# Patient Record
Sex: FEMALE | Race: WHITE
[De-identification: names, ages, dates, MRNs, and addresses within clinical notes are randomized per-mention and may not be internally consistent; named-entity substitution may affect disease eponyms.]

---

## 2017-01-06 ENCOUNTER — HOSPITAL ENCOUNTER (OUTPATIENT)
Dept: HOSPITAL 80 - FPAT | Age: 67
Discharge: HOME | End: 2017-01-06
Attending: PAIN MEDICINE
Payer: MEDICAID

## 2017-01-06 DIAGNOSIS — M25.551: ICD-10-CM

## 2017-01-06 DIAGNOSIS — M16.11: Primary | ICD-10-CM

## 2017-01-06 NOTE — DX
Fluoroscopy Greater Than an Hour

 

Indication: Right hip injection.

 

Fluoroscopy Time: 48.7 seconds.

Dose: 12.85 mGy.

 

Findings:  Two spot fluoroscopic images were performed showing needle approach to the right hip with 
contrast injection.

 

Impression: Fluoroscopy provided for right hip injection. Please see separate report for full clinica
l detail.

## 2017-01-10 NOTE — GPN
[f 
rep st]



                                                                 PROCEDURE NOTE



DATE OF PROCEDURE:  01/06/2017



TIME OF PROCEDURE:  10 a.m.



HISTORY OF PRESENT ILLNESS:  The patient presents for right hip joint injection 
for the treatment of right hip pain and osteoarthritis.  The last such 
injection done 07/27/2016 provided relief until about 1 month ago.  She is not 
taking any blood thinners or antibiotics and denies allergies to shellfish, 
latex, contrast dye, and iodine.  She denies pregnancy.



PROCEDURE:  Right hip joint injection.



DIAGNOSIS:  Right hip osteoarthritis and pain.



SITE:  Right groin.



PROVIDER:  Kasia Pan MD



ANESTHESIA:  Local with Versed 6 mg and fentanyl 100 mcg IV.



COMPLICATIONS:  None.



ESTIMATED BLOOD LOSS:  Minimal.



PREPROCEDURE CONSENT:  The preprocedure consent was obtained after the risks, 
benefits, and alternatives of the procedure were explained to the patient.  The 
risks include but are not limited to nerve injury, joint injury, spinal cord 
injury, blood vessel injury, paralysis, brain injury or stroke, muscle injury, 
infection, adverse medication effect, bleeding, increased pain, death, and any 
unforeseen consequences.  The patient agreed and signed the consent for the 
procedure.



PROCEDURE VERIFICATION AND TIMEOUT:  Verbal verification of patient, site, and 
procedure was done.  All present were in agreement.  Please see nursing note 
for time of timeout.



DESCRIPTION OF PROCEDURE:  The patient was identified and placed in a supine 
position with the right hip externally rotated.  Under fluoroscopic guidance, 
the femoral neck was identified and the area over the target site was prepped 
with ChloraPrep and draped in a sterile fashion.  Then, 2 mL of 1% lidocaine 
were injected subcutaneously over the above location.  After adequate local 
analgesia was obtained, a 22-gauge 3-1/2 inch spinal needle was advanced into 
the hip joint capsule under intermittent biplanar fluoroscopic guidance.  The 
needle position was confirmed to be correct with fluoroscopy.  After negative 
aspiration, 1 mL of nonionic contrast dye was injected.  Good flow of dye was 
noted in a pattern outlining the hip joint.  Then, 8 mL of a solution 
containing 80 mg of triamcinolone and 0.25% bupivacaine were injected in 1 mL 
increments with negative aspiration in between and no pain or paresthesia upon 
injection. 



The patient tolerated the procedure well and was monitored for 30 more minutes 
with no apparent complications, then was discharged home in good condition with 
a ride.  She experienced no side effects from sedation and was instructed not 
to drive, operate heavy machinery, or make any life-altering decisions today.  
She was instructed to call our clinic with nonurgent concerns or 911 in an 
emergency.  In particular, she was taught that new weakness or numbness, 
changes in bowel or bladder control, fever and swelling or redness over the 
injection site are all urgent concerns that would warrant calling 911 or going 
to an emergency care facility.  She verbalized understanding and was discharged 
home with postprocedure instructions.



ASSESSMENT AND PLAN:  Right hip joint injection done today without 
complications.  The patient will return to our clinic for followup and we will 
consider repeating the injection as needed.





Job #:  [id number]

MTDD

## 2017-03-01 ENCOUNTER — HOSPITAL ENCOUNTER (OUTPATIENT)
Dept: HOSPITAL 80 - FSGY | Age: 67
Discharge: HOME | End: 2017-03-01
Attending: PAIN MEDICINE
Payer: MEDICAID

## 2017-03-01 DIAGNOSIS — M25.551: ICD-10-CM

## 2017-03-01 DIAGNOSIS — M70.61: Primary | ICD-10-CM

## 2017-03-01 DIAGNOSIS — J44.9: ICD-10-CM

## 2017-03-01 DIAGNOSIS — F31.9: ICD-10-CM

## 2017-05-11 ENCOUNTER — HOSPITAL ENCOUNTER (EMERGENCY)
Dept: HOSPITAL 80 - CED | Age: 67
Discharge: HOME | End: 2017-05-11
Payer: MEDICAID

## 2017-05-11 VITALS
SYSTOLIC BLOOD PRESSURE: 107 MMHG | TEMPERATURE: 98.6 F | HEART RATE: 105 BPM | OXYGEN SATURATION: 91 % | DIASTOLIC BLOOD PRESSURE: 65 MMHG

## 2017-05-11 VITALS — RESPIRATION RATE: 16 BRPM

## 2017-05-11 DIAGNOSIS — Z79.82: ICD-10-CM

## 2017-05-11 DIAGNOSIS — J44.9: Primary | ICD-10-CM

## 2017-05-11 LAB
% IMMATURE GRANULYOCYTES: 0.7 % (ref 0–1.1)
ABSOLUTE IMMATURE GRANULOCYTES: 0.06 10^3/UL (ref 0–0.1)
ABSOLUTE NRBC COUNT: 0 10^3/UL (ref 0–0.01)
ADD DIFF?: NO
ADD MORPH?: NO
ADD SCAN?: NO
ANION GAP SERPL CALC-SCNC: 11 MEQ/L (ref 8–16)
ATYPICAL LYMPHOCYTE FLAG: 40 (ref 0–99)
CALCIUM SERPL-MCNC: 9.1 MG/DL (ref 8.5–10.4)
CHLORIDE SERPL-SCNC: 96 MEQ/L (ref 97–110)
CO2 SERPL-SCNC: 30 MEQ/L (ref 22–31)
CREAT SERPL-MCNC: 0.6 MG/DL (ref 0.6–1)
ERYTHROCYTE [DISTWIDTH] IN BLOOD BY AUTOMATED COUNT: 11.6 % (ref 11.5–15.2)
FRAGMENT RBC FLAG: 0 (ref 0–99)
GFR SERPL CREATININE-BSD FRML MDRD: > 60 ML/MIN/{1.73_M2}
GLUCOSE SERPL-MCNC: 189 MG/DL (ref 70–100)
HCT VFR BLD CALC: 39.9 % (ref 38–47)
HGB BLD-MCNC: 14.1 G/DL (ref 12.6–16.3)
LEFT SHIFT FLG: 10 (ref 0–99)
LIPEMIA HEMOLYSIS FLAG: 90 (ref 0–99)
MCH RBC BLDCO QN: 34.5 PG (ref 27.9–34.1)
MCHC RBC AUTO-ENTMCNC: 35.3 G/DL (ref 32.4–36.7)
MCV RBC AUTO: 97.6 FL (ref 81.5–99.8)
NRBC-AUTO%: 0 % (ref 0–0.2)
PLATELET # BLD: 238 10^3/UL (ref 150–400)
PLATELET CLUMPS FLAG: 10 (ref 0–99)
PMV BLD AUTO: 9.8 FL (ref 8.7–11.7)
POTASSIUM SERPL-SCNC: 3.1 MEQ/L (ref 3.5–5.2)
RBC # BLD AUTO: 4.09 10^6/UL (ref 4.18–5.33)
SODIUM SERPL-SCNC: 137 MEQ/L (ref 134–144)
TROPONIN I SERPL-MCNC: < 0.012 NG/ML (ref 0–0.03)

## 2017-05-11 NOTE — EDPHY
HPI/HX/ROS/PE/MDM


Narrative: 





CHIEF COMPLAINT:  Chest pain





HPI: The patient is a 66-year-old female with a history of COPD.  She has been 

in her usual state of health until this morning she woke up and coughed.  

Immediately following coughing, the patient experienced sharp anterior chest 

pain which persisted.  Pain was primarily present only with coughing and has 

now largely subsided.  She describes this as the worst pain she has felt.  She 

denies diaphoresis, shortness of breath or syncope.  She denies recent illness 

or hemoptysis.  She wears 2 L of oxygen at baseline.





REVIEW OF SYSTEMS:


Aside from elements discussed in the HPI, a comprehensive 10-point review of 

systems was reviewed and is negative.





PMH:  COPD, no known cardiac history.





SOCIAL HISTORY:  Trying to quit smoking.  Denies alcohol or drug abuse.





PHYSICAL EXAM:


General:Patient is alert, in no acute distress.


ENT:Eyes are normal to inspection.  ENT inspection normal.


Neck: Normal inspection.  Full range of motion.


Respiratory:No respiratory distress. Bilateral expiratory wheezes and 

diminished air movement noted.


Cardiovascular: Regular rate and rhythm.  Strong peripheral pulses.  Normal cap 

refill.


Abdomen:The abdomen is nontender to palpation. There are no peritoneal signs. 

There are normal bowel sounds.


Back: Normal to inspection.  No tenderness to palpation.


Skin: Normal color.  No rash.  Warm and dry.


Extremities: Normal appearance.  Full range of motion.


Neuro: Oriented x3.  Normal motor function.  Normal sensory function. (Daryn De Leon)


ED Course: 





EKG reveals sinus tachycardia with no acute ST changes. (Daryn De Leon)


MDM: 





1531: CT scan of the angiogram chest with IV contrast.  The results of the 

study are shows no evidence of pulmonary embolism there is mucous plugging and 

atelectasis of the left lingula.  No focal pneumonia.  There is bronchial wall 

thickening..  The study was read by Dr. Napoleon Longo. I viewed the images 

myself on the PACS system.





1557:  This patient was signed over to me at 3:00 p.m. shift change from Dr. De Leon.  The patient was pending a CT angiogram per Dr. De Leon the patient 

go home with the CT angiogram showed no pulmonary embolism.  CT angiogram has 

been reviewed shows linear atelectasis mucous plugging at the left lung.  This 

consistent where it is on the x-ray.  No focal pneumonia.  No PE.  I did offer 

patient admission for shortness of breath however she has declined.  She does 

have significant COPD, wears 2 L 24 hours a day at home, she tells me normally 

her oxygen saturation runs around 89-91%.  On 2 L she tells me she runs 93-94.  

Here in the emergency room she has been running 89% to 90 on room air.  I did 

offer breathing treatment here before going home she tells me that she has 

nebulizer at home and medications for breathing treatment she would not like 1 

here and she would like to go home.  She is requesting discharge. She does not 

have chest pain.  Troponin negative.  D-dimer positive CT angiogram negative 

for PE.  COPD appearing picture on chest x-ray and CT. (Lobo Dobbs)





- Data Points


Imaging Results: 


 Imaging Impressions





Chest X-Ray  05/11/17 13:35


Impression:


1. COPD with mild perihilar bronchitis.


2. Interim development of a lingular infiltrate (and/or subsegmental atelectasis

) since 12/1/2016. Follow-up to assure resolution is recommended.


 


Findings and recommendations were discussed with Daryn De Leon MD  at  14:35

, on 5/11/2017.


 








Chest/Thorax CTA  05/11/17 14:42


Impression:


1. No evidence of thrombopulmonary embolic disease.


2. Three-vessel calcified coronary plaque.


3. Minimally worse lingular atelectasis and peripheral mucous plugging.


4. No acute pulmonary process.


5. Tiny benign left upper lobe pulmonary nodules.


 


Findings discussed with Emergency Department physician, Daryn De Leon MD on 

5/11/2017 at 1530 hours.


 











Laboratory Results: 


 Laboratory Results





 05/11/17 14:10 





 05/11/17 14:10 





 











  05/11/17 05/11/17 05/11/17





  14:10 14:10 14:10


 


WBC      8.72 10^3/uL 10^3/uL





     (3.80-9.50) 


 


RBC      4.09 10^6/uL L 10^6/uL





     (4.18-5.33) 


 


Hgb      14.1 g/dL g/dL





     (12.6-16.3) 


 


Hct      39.9 % %





     (38.0-47.0) 


 


MCV      97.6 fL fL





     (81.5-99.8) 


 


MCH      34.5 pg H pg





     (27.9-34.1) 


 


MCHC      35.3 g/dL g/dL





     (32.4-36.7) 


 


RDW      11.6 % %





     (11.5-15.2) 


 


Plt Count      238 10^3/uL 10^3/uL





     (150-400) 


 


MPV      9.8 fL fL





     (8.7-11.7) 


 


Neut % (Auto)      74.9 % H %





     (39.3-74.2) 


 


Lymph % (Auto)      13.9 % L %





     (15.0-45.0) 


 


Mono % (Auto)      9.5 % %





     (4.5-13.0) 


 


Eos % (Auto)      0.5 % L %





     (0.6-7.6) 


 


Baso % (Auto)      0.5 % %





     (0.3-1.7) 


 


Nucleat RBC Rel Count      0.0 % %





     (0.0-0.2) 


 


Absolute Neuts (auto)      6.54 10^3/uL H 10^3/uL





     (1.70-6.50) 


 


Absolute Lymphs (auto)      1.21 10^3/uL 10^3/uL





     (1.00-3.00) 


 


Absolute Monos (auto)      0.83 10^3/uL H 10^3/uL





     (0.30-0.80) 


 


Absolute Eos (auto)      0.04 10^3/uL 10^3/uL





     (0.03-0.40) 


 


Absolute Basos (auto)      0.04 10^3/uL 10^3/uL





     (0.02-0.10) 


 


Absolute Nucleated RBC      0.00 10^3/uL 10^3/uL





     (0-0.01) 


 


Immature Gran %      0.7 % %





     (0.0-1.1) 


 


Immature Gran #      0.06 10^3/uL 10^3/uL





     (0.00-0.10) 


 


D-Dimer    0.61 ug/mLFEU H ug/mLFEU  





    (0.00-0.50)  


 


Sodium  137 mEq/L mEq/L    





   (134-144)   


 


Potassium  3.1 mEq/L L mEq/L    





   (3.5-5.2)   


 


Chloride  96 mEq/L L mEq/L    





   ()   


 


Carbon Dioxide  30 mEq/l mEq/l    





   (22-31)   


 


Anion Gap  11 mEq/L mEq/L    





   (8-16)   


 


BUN  10 mg/dL mg/dL    





   (7-23)   


 


Creatinine  0.6 mg/dL mg/dL    





   (0.6-1.0)   


 


Estimated GFR  > 60     





    


 


Glucose  189 mg/dL H mg/dL    





   ()   


 


Calcium  9.1 mg/dL mg/dL    





   (8.5-10.4)   


 


Troponin I  < 0.012 ng/mL ng/mL    





   (0-0.034)   














General


Time Seen by Provider: 05/11/17 13:36


Initial Vital Signs: 


 Initial Vital Signs











Temperature (C)  37.2 C   05/11/17 13:59


 


Heart Rate  119 H  05/11/17 13:59


 


Respiratory Rate  18   05/11/17 13:59


 


Blood Pressure  131/64 H  05/11/17 13:59


 


O2 Sat (%)  85 L  05/11/17 13:59








 











O2 Delivery Mode               Nasal Cannula


 


O2 (L/minute)                  2














Allergies/Adverse Reactions: 


 





No Known Allergies Allergy (Verified 01/05/17 16:50)


 








Home Medications: 














 Medication  Instructions  Recorded


 


Advair 100/50 (*)  IH BID 07/19/16


 


IBUPROFEN 800 mg pe PO BID 07/19/16


 


Senna 1 tab PO DAILY 07/19/16


 


Spiriva Handihaler 18 mcg IH DAILY 07/19/16


 


ZOLPIDEM TARTRATE 12.5 mg PO DAILY 07/19/16


 


traMADol 50 mg PO PRN PRN 07/19/16


 


Furosemide  01/05/17


 


Herbals/Supplements -Info Only  01/05/17


 


Albuterol 5 mg/ml INH  02/22/17


 


Aspirin  02/22/17


 


Cyclobenzaprine  02/22/17


 


Flonase Nasal Spray  02/22/17


 


Omeprazole  02/22/17


 


Potassium  02/22/17


 


Simvastatin  02/22/17


 


Xtampza ER  02/22/17


 


Oxycodone HCl ER  03/01/17














Departure





- Departure


Disposition: Home, Routine, Self-Care


Clinical Impression: 


 Acute dyspnea





COPD (chronic obstructive pulmonary disease)


Qualifiers:


 COPD type: unspecified COPD Qualified Code(s): J44.9 - Chronic obstructive 

pulmonary disease, unspecified





Condition: Good


Instructions:  COPD (Chronic Obstructive Pulmonary Disease) (ED)


Additional Instructions: 


1. please follow up with her primary care doctor


2. Return emergency room if there is any worsening symptoms questions or 

concerns.


Referrals: 


Krystle Pinto MD [Primary Care Provider] - As per Instructions

## 2017-05-14 NOTE — CPEKG
Heart Rate: 111

RR Interval: 541

P-R Interval: 152

QRSD Interval: 102

QT Interval: 360

QTC Interval: 489

P Axis: 68

QRS Axis: 19

T Wave Axis: 40

EKG Severity - BORDERLINE ECG -

EKG Impression: SINUS TACHYCARDIA

EKG Impression: LOW VOLTAGE IN FRONTAL LEADS

EKG Impression: BORDERLINE PROLONGED QT INTERVAL

Electronically Signed By: Bridgette Galvan 15-May-2017 17:24:53

## 2017-05-28 ENCOUNTER — HOSPITAL ENCOUNTER (OUTPATIENT)
Dept: HOSPITAL 80 - FCPNEURO | Age: 67
End: 2017-05-28
Attending: PSYCHIATRY & NEUROLOGY
Payer: MEDICAID

## 2017-05-28 DIAGNOSIS — G47.33: Primary | ICD-10-CM

## 2017-07-11 ENCOUNTER — HOSPITAL ENCOUNTER (OUTPATIENT)
Dept: HOSPITAL 80 - FCPNEURO | Age: 67
End: 2017-07-11
Attending: PSYCHIATRY & NEUROLOGY
Payer: COMMERCIAL

## 2017-07-11 DIAGNOSIS — G47.34: ICD-10-CM

## 2017-07-11 DIAGNOSIS — G47.61: ICD-10-CM

## 2017-07-11 DIAGNOSIS — G47.33: Primary | ICD-10-CM

## 2017-10-09 ENCOUNTER — HOSPITAL ENCOUNTER (OUTPATIENT)
Dept: HOSPITAL 80 - CIMAGING | Age: 67
End: 2017-10-09
Attending: NURSE PRACTITIONER
Payer: COMMERCIAL

## 2017-10-09 DIAGNOSIS — M47.816: ICD-10-CM

## 2017-10-09 DIAGNOSIS — M16.0: Primary | ICD-10-CM

## 2017-12-26 ENCOUNTER — HOSPITAL ENCOUNTER (OUTPATIENT)
Dept: HOSPITAL 80 - BHFA | Age: 67
End: 2017-12-26
Attending: INTERNAL MEDICINE
Payer: COMMERCIAL

## 2017-12-26 DIAGNOSIS — I25.10: Primary | ICD-10-CM

## 2017-12-26 PROCEDURE — A9500 TC99M SESTAMIBI: HCPCS

## 2017-12-26 PROCEDURE — 93017 CV STRESS TEST TRACING ONLY: CPT

## 2017-12-26 PROCEDURE — 78452 HT MUSCLE IMAGE SPECT MULT: CPT

## 2018-02-28 ENCOUNTER — HOSPITAL ENCOUNTER (INPATIENT)
Dept: HOSPITAL 80 - F3N | Age: 68
LOS: 3 days | Discharge: NURSING, REHAB OR LONG-TERM CARE | DRG: 470 | End: 2018-03-03
Attending: ORTHOPAEDIC SURGERY | Admitting: ORTHOPAEDIC SURGERY
Payer: COMMERCIAL

## 2018-02-28 DIAGNOSIS — G47.30: ICD-10-CM

## 2018-02-28 DIAGNOSIS — F17.210: ICD-10-CM

## 2018-02-28 DIAGNOSIS — M16.11: Primary | ICD-10-CM

## 2018-02-28 PROCEDURE — 0SR904A REPLACEMENT OF RIGHT HIP JOINT WITH CERAMIC ON POLYETHYLENE SYNTHETIC SUBSTITUTE, UNCEMENTED, OPEN APPROACH: ICD-10-PCS | Performed by: ORTHOPAEDIC SURGERY

## 2018-02-28 PROCEDURE — C1713 ANCHOR/SCREW BN/BN,TIS/BN: HCPCS

## 2018-02-28 RX ADMIN — Medication SCH MLS: at 14:09

## 2018-02-28 RX ADMIN — FAMOTIDINE SCH MG: 20 TABLET, FILM COATED ORAL at 21:20

## 2018-02-28 RX ADMIN — OXYCODONE HYDROCHLORIDE PRN MG: 15 TABLET ORAL at 18:00

## 2018-02-28 RX ADMIN — ACETAMINOPHEN SCH MG: 325 TABLET ORAL at 17:56

## 2018-02-28 RX ADMIN — SODIUM CHLORIDE, SODIUM LACTATE, POTASSIUM CHLORIDE, AND CALCIUM CHLORIDE SCH MLS: 600; 310; 30; 20 INJECTION, SOLUTION INTRAVENOUS at 12:53

## 2018-02-28 RX ADMIN — ACETAMINOPHEN SCH MG: 325 TABLET ORAL at 12:51

## 2018-02-28 RX ADMIN — Medication SCH MLS: at 22:50

## 2018-02-28 RX ADMIN — DOCUSATE SODIUM AND SENNOSIDES SCH TAB: 50; 8.6 TABLET ORAL at 21:20

## 2018-02-28 RX ADMIN — MORPHINE SULFATE SCH MG: 15 TABLET, FILM COATED, EXTENDED RELEASE ORAL at 21:21

## 2018-02-28 RX ADMIN — PRAVASTATIN SODIUM SCH MG: 10 TABLET ORAL at 17:56

## 2018-02-28 RX ADMIN — ASPIRIN SCH MG: 325 TABLET, FILM COATED ORAL at 21:21

## 2018-02-28 RX ADMIN — TRANEXAMIC ACID SCH MG: 650 TABLET ORAL at 17:56

## 2018-02-28 RX ADMIN — OXYCODONE HYDROCHLORIDE PRN MG: 15 TABLET ORAL at 12:52

## 2018-02-28 RX ADMIN — CYCLOBENZAPRINE HYDROCHLORIDE PRN MG: 10 TABLET, FILM COATED ORAL at 12:52

## 2018-02-28 RX ADMIN — KETOROLAC TROMETHAMINE PRN MG: 30 INJECTION, SOLUTION INTRAMUSCULAR at 12:51

## 2018-02-28 RX ADMIN — SODIUM CHLORIDE, SODIUM LACTATE, POTASSIUM CHLORIDE, AND CALCIUM CHLORIDE SCH MLS: 600; 310; 30; 20 INJECTION, SOLUTION INTRAVENOUS at 21:21

## 2018-02-28 NOTE — PDANEPAE
ANE Past Medical History





- Cardiovascular History


Hx Hypertension: No


Hx Arrhythmias: No


Hx Chest Pain: No


Hx Coronary Artery / Peripheral Vascular Disease: Yes


Hx CHF / Valvular Disease: No


Hx Palpitations: No


Cardiovascular History Comment: ATHEROSCLEROSIS.  HYPERLIPIDEMIA





- Pulmonary History


Hx COPD: Yes


Hx Asthma/Reactive Airway Disease: No


Hx Recent Upper Respiratory Infection: No


Hx Oxygen in Use at Home: Yes


O2 in Use at Home (L/minute): NOC O2 W/CPAP


Hx Sleep Apnea: Yes


Sleep Apnea Screening Result - Last Documented: Positive


Pulmonary History Comment: DANNI W/CPAP





- Neurologic History


Hx Cerebrovascular Accident: No


Hx Seizures: No


Hx Dementia: No


Neurologic History Comment: none





- Endocrine History


Hx Diabetes: No


Endocrine History Comment: PRE DIAB





- Renal History


Hx Renal Disorders: No





- Liver History


Hx Hepatic Disorders: No


Hepatic History Comment: GALL STONES





- Neurological & Psychiatric Hx


Hx Neurological and Psychiatric Disorders: No


Neurological / Psychiatric History Comment: knee,ankle JOINT pain





- Cancer History


Hx Cancer: No





- Congenital Disorder History


Hx Congenital Disorders: No





- GI History


Hx Gastrointestinal Disorders: No


Gastrointestinal History Comment: OCCAS HEARTBURN





- Other Health History


Other Health History: CHRONIC PAIN PT.  DJD/DDD.  OSTEOPOROSIS





- Chronic Pain History


Chronic Pain: Yes (ARTHRITIS BACK AND JOINTS)





- Surgical History


Prior Surgeries: finger ORIF,.  hernia repair after birth of twins.  HAND 

TRAUMA.  EPIDURAL INJS STEROID.  COLONOSCOPY





ANE Review of Systems


Review of Systems: 








- Exercise capacity


METS (RN): 4 METS





ANE Patient History





- Allergies


Allergies/Adverse Reactions: 








No Known Allergies Allergy (Verified 02/28/18 08:38)


 








- Home Medications


Home Medications: 








Albuterol [Proventil Inhaler HFA (*)] 1 - 2 puffs IH DAILY PRN 01/30/18 [Last 

Taken 02/21/18]


Calcium Carbonate [Oyster Shell Calcium 500 mg (*)] 500 mg PO DAILY 01/30/18 [

Last Taken 02/18/18]


Cholecalciferol Vit D3 [Vitamin D3 (*)] 5,000 units PO DAILY 01/30/18 [Last 

Taken 02/18/18]


Cyanocobalamin [Vitamin B12 (*)] 1,000 mcg PO DAILY 01/30/18 [Last Taken 02/18/ 18]


Cyclobenzaprine [Flexeril 10 MG (*)] 10 mg PO TID PRN 01/30/18 [Last Taken 02/27 /18]


Fluticasone Nasal [Flonase Nasal Spray (RX)] 1 sprays NASAL BID PRN 01/30/18 [

Last Taken 02/27/18]


Furosemide [Lasix 20 MG (*)] 20 mg PO DAILY PRN 01/30/18 [Last Taken 02/26/18]


Herbals/Supplements -Info Only 1 ea PO DAILY 01/30/18 [Last Taken 02/18/18]


Ibuprofen [Motrin (*)] 800 mg PO TID 01/30/18 [Last Taken 02/18/18]


Magnesium Hydroxide [Milk of Magnesia] 400 mg PO DAILY PRN 01/30/18 [Last Taken 

02/18/18]


Potassium Cl [Klor-Con] 10 meq PO DAILY PRN 01/30/18 [Last Taken 02/26/18]


Simvastatin [Zocor] 5 mg PO DAILY18 01/30/18 [Last Taken 02/27/18]


diphenhydrAMINE [Benadryl 25 MG (*)] 25 mg PO HS 01/30/18 [Last Taken 02/26/18]


morphINE IR [morphINE IR 15 mg (*)] 15 mg PO TID PRN 01/30/18 [Last Taken 02/28/ 18]


morphINE SR [Ms Contin/Oramorph 15 mg (*)] 15 mg PO BID 01/30/18 [Last Taken 02/ 28/18]


traMADol [Ultram 50 mg (*)] 50 mg PO TID PRN 01/30/18 [Last Taken 02/27/18]








- NPO status


NPO Since - Liquids (Date): 02/28/18


NPO Since - Liquids (Time): 06:30


NPO Since - Solids (Date): 02/27/18


NPO Since - Solids (Time): 23:00





- Smoking Hx


Smoking Status: Light smoker





- Family Anes Hx


Family Hx Anesthesia Complications: NONE





ANE Labs/Vital Signs





- Vital Signs


Blood Pressure: 99/60


Heart Rate: 82


Respiratory Rate: 16


O2 Sat (%): 90


Height: 162.56 cm


Weight: 65.317 kg





ANE Physical Exam





- Airway


Neck exam: decreased ROM


Mallampati Score: Class 3


Mouth exam: normal dental/mouth exam





- Pulmonary


Pulmonary: no respiratory distress





- Cardiovascular


Cardiovascular: regular rate and rhythym





- ASA Status


ASA Status: III





ANE Anesthesia Plan


Anesthesia Plan: spinal

## 2018-02-28 NOTE — POSTOPPROG
Post Op Note


Date of Operation: 02/28/18


Surgeon: Gurjit Vicente


Assistant: Nic/Lory


Anesthesiologist: Keyonna


Post-op Diagnosis:  right hip severe degenerative arthritis


Procedure:  right total hip arthroplasty


Inf/Abcess present in the surg proc area at time of surgery?: No


EBL: 100-500

## 2018-02-28 NOTE — GOP
[f rep st]



                                                                OPERATIVE REPORT





DATE OF OPERATION:  02/28/2018



SURGEON:  Gurjit Vicente MD



ASSISTANT:  Trell Lucero and Kalpesh Henley.



ANESTHESIA:  Marcaine, spinal and IV sedation by Dr. Willi Newby.



PREOPERATIVE DIAGNOSIS:  Right hip severe degenerative arthritis.



POSTOPERATIVE DIAGNOSIS:  Right hip severe degenerative arthritis.



PROCEDURE PERFORMED:  Right total hip arthroplasty, ceramic femoral head on highly cross-linked polye
thylene cup liner.



FINDINGS:  





DESCRIPTION OF PROCEDURE:  The patient was given 2 g of IV Ancef preoperatively within 60 minutes of 
surgery.  She was also given IV tranexamic acid at a dose of 20 mg/kg.  She was placed on the operati
ng room table and given spinal anesthesia with Marcaine by Dr. Newby.  She was then placed supine 
and given IV sedation.  A Rose catheter was not used.  She wore a JANKI stocking and SCD on the nonope
rative leg.  She was rolled to the left lateral decubitus position.  The position was secured with 
e pegboard table attachment.  An axillary roll was used, and all pressure points were carefully padde
d.  I was careful to lock her pelvis in a vertical position.  Her perineum was isolated with plastic 
adhesive drapes.  The right hip and right lower extremity were prepped with ChloraPrep.  They were dr
aped free using sterile sheets, stockinette, and Ioban plastic adhesive drapes. 



The [employer] time-out was performed to verify the correct surgical side and site and
 the correct patient identity.  The [place] time-out was also performed. 



I made a 5-inch straight oblique posterolateral hip skin incision.  The subcutaneous tissues were sha
rply divided and hemostasis was obtained using electrocautery.  The fascia bryce was identified and sp
lit along the axis of its fibers.  I curved posteriorly and proximally, and split the fascia of the g
luteus joseluis and bluntly split the muscle fibers in line with their orientation.  The Charnley self
-retaining retractor was inserted.  Her sciatic nerve was located, partially exposed, and protected t
hroughout the procedure.  The external rotators and the posterior hip capsule were divided as separat
e layers at the base of the femoral neck, tagged, and reflected posteriorly.  A smooth 8-inch Steinma
nn pin was inserted vertically into the ilium, superior to the acetabulum.  A 1/8-inch drill bit was 
inserted vertically into the greater trochanter and parallel to the first pin.  The distance between 
the two was measured for leg length reference.  Her femoral head was dislocated posteriorly.  Severe 
degenerative changes were present on her femoral head.  The femoral neck was osteotomized at the appr
opriate level and inclination. 



I was careful to preserve all the posterior capsule and most of the anterior capsule.  The remnant of
 her damaged labrum was excised. 



I prepared the femur first.  This allowed me to  the amount of natural femoral neck anteversion.
  This, in turn, allowed me to later determine the correct amount of cup anteversion.  She had approx
imately 20 degrees of natural femoral neck anteversion.  Her canal was opened laterally with a box ch
dalia.  I power reamed with the starter reamer and then hand broached sequentially up to a size 5.  I 
used a Candelaria Accolade II high offset broach as a trial stem.  I was careful to lateralize adequatel
y. 



Appropriate retractors were inserted to expose the acetabulum.  The acetabulum was reamed sequentiall
y up to 53 mm.  I selected a 54 mm [place] Tritanium cluster hole hemispherical shell.  This was memo
ed securely into place in the proper degree of inclination and anteversion.  I used the transverse ac
etabular ligament and other acetabular bony landmarks to help me properly orient the cup.  She had a 
thin medial wall of the acetabulum and I was careful not to over ream.  I inserted 30 mm and 25 mm lance
pplemental screws through the shell for additional fixation.  I also inserted a screw in metal dome h
ole plug. 



I performed a series of trial reductions to determine length and stability.  I concluded that the siz
e 5 stem with high offset with the -2.5 mm neck length and a 36 mm head with a 0 degree or flush line
r gave me the proper combination of appropriate length and good anterior and posterior stability. 

The flush, or 0-degree [place] X3 highly cross-linked polyethylene liner, was inserted and tapped sec
urely into place.  I chose the [place] Accolade II stem in a size 5 with high offset.  This was inser
janki press-fit.  I did one final trial reduction and confirmed that the -2.5 mm neck length with the 3
6 mm head was the proper combination.  The Candelaria Biolox Delta ceramic head with an outside diameter
 of 36 mm and a neck length of -2.5 was tapped securely onto the clean trunnion.  The acetabulum was 
irrigated and cleaned, and the hip was reduced one final time.  She had excellent anterior and poster
ior stability and appropriate length.  She was a few millimeters short preoperatively, and I was inte
ntionally lengthening her. 



40 mL of the joint anesthetic cocktail were injected into the capsule, the deep musculature, and the 
subcutaneous tissues around the skin edges.  The joint was thoroughly irrigated one final time with a
 dilute Betadine solution.  Her sciatic nerve was reinspected and looked unharmed. 



The external rotators and the posterior hip capsule were repaired in separate layers with #2 FiberWir
e sutures through drill holes in the greater trochanter.  This provided a strong posterior capsular a
nd external rotator repair.  The fascia bryce was closed first with two #2 figure-of-eight FiberWire s
utures, followed by a running #2 barbed Ethicon Stratafix PDO suture.  The subcutaneous tissues were 
closed with a running 0 barbed Ethicon Stratafix Monoderm suture.  The skin was closed with a running
 3-0 barbed Ethicon Stratafix Monoderm subcuticular suture.  The skin edges were reapproximated and s
ealed with Dermabond glue.  The wound was covered with a large piece of waterproof Mepilex surgical d
ressing. 



A long-leg JANKI stocking and SCD were applied to her right lower extremity.  She wore a stocking and S
CD on the opposite leg during the procedure.  An abduction pillow was placed between her knees.  She 
was awakened from anesthesia and rolled to the supine position on her hospitals.  She was taken
 to PACU in satisfactory condition.  There were no recognized intraoperative complications. 



The estimated blood loss was about 300 mL.  The sponge and needle count were correct on 2 occasions. 




I used a [place] Tritanium hemispherical cluster hole acetabular shell with an outside diameter of 54
 mm.  The liner was a Candelaria X3 0-degree highly cross-linked liner with an inside diameter of 36 mm.
  The femoral component was a press-fit [place] Accolade II stem and high offset with in a size 5.  T
he femoral head was a [place] Biolox Delta ceramic head with a -2.5 mm neck length and a 36 mm outsid
e diameter. 



Trell Lucero and Kalpesh Henley acted as surgical assistants.  Their assistance was a medical necess
ity for safe completion of the procedure.





Job #:  [id number]

## 2018-02-28 NOTE — ASMTCMCOM
CM Note

 

CM Note                       

Notes:

Pt s/p R total hip arthroplasty. PT rec SNF today, pt sleeping but dghtrs present and request 

referral [facility] CM to follow for d/c planning. 

 

Date Signed:  02/28/2018 02:30 PM

Electronically Signed By:REESE Lopez

## 2018-03-01 RX ADMIN — DOCUSATE SODIUM AND SENNOSIDES SCH TAB: 50; 8.6 TABLET ORAL at 08:27

## 2018-03-01 RX ADMIN — MORPHINE SULFATE SCH MG: 15 TABLET, FILM COATED, EXTENDED RELEASE ORAL at 08:26

## 2018-03-01 RX ADMIN — OXYMETAZOLINE HYDROCHLORIDE PRN SPR: 0.05 SPRAY NASAL at 17:58

## 2018-03-01 RX ADMIN — TRANEXAMIC ACID SCH MG: 650 TABLET ORAL at 08:27

## 2018-03-01 RX ADMIN — FAMOTIDINE SCH MG: 20 TABLET, FILM COATED ORAL at 19:52

## 2018-03-01 RX ADMIN — NICOTINE SCH MG: 21 PATCH, EXTENDED RELEASE TOPICAL at 17:56

## 2018-03-01 RX ADMIN — FAMOTIDINE SCH MG: 20 TABLET, FILM COATED ORAL at 08:28

## 2018-03-01 RX ADMIN — DOCUSATE SODIUM AND SENNOSIDES SCH TAB: 50; 8.6 TABLET ORAL at 19:52

## 2018-03-01 RX ADMIN — Medication SCH MG: at 08:27

## 2018-03-01 RX ADMIN — ACETAMINOPHEN SCH MG: 325 TABLET ORAL at 02:15

## 2018-03-01 RX ADMIN — IPRATROPIUM BROMIDE PRN SPR: 42 SPRAY, METERED NASAL at 18:00

## 2018-03-01 RX ADMIN — MORPHINE SULFATE SCH MG: 15 TABLET, FILM COATED, EXTENDED RELEASE ORAL at 19:52

## 2018-03-01 RX ADMIN — KETOROLAC TROMETHAMINE PRN MG: 30 INJECTION, SOLUTION INTRAMUSCULAR at 12:23

## 2018-03-01 RX ADMIN — CYCLOBENZAPRINE HYDROCHLORIDE PRN MG: 10 TABLET, FILM COATED ORAL at 23:44

## 2018-03-01 RX ADMIN — KETOROLAC TROMETHAMINE PRN MG: 30 INJECTION, SOLUTION INTRAMUSCULAR at 03:57

## 2018-03-01 RX ADMIN — OXYCODONE HYDROCHLORIDE PRN MG: 15 TABLET ORAL at 09:19

## 2018-03-01 RX ADMIN — TRANEXAMIC ACID SCH MG: 650 TABLET ORAL at 02:15

## 2018-03-01 RX ADMIN — CYCLOBENZAPRINE HYDROCHLORIDE PRN MG: 10 TABLET, FILM COATED ORAL at 02:17

## 2018-03-01 RX ADMIN — OXYCODONE HYDROCHLORIDE PRN MG: 15 TABLET ORAL at 15:52

## 2018-03-01 RX ADMIN — FLUTICASONE PROPIONATE PRN SPR: 50 SPRAY, METERED NASAL at 17:59

## 2018-03-01 RX ADMIN — ASPIRIN SCH MG: 325 TABLET, FILM COATED ORAL at 08:28

## 2018-03-01 RX ADMIN — ACETAMINOPHEN SCH MG: 325 TABLET ORAL at 12:31

## 2018-03-01 RX ADMIN — OXYCODONE HYDROCHLORIDE PRN MG: 15 TABLET ORAL at 02:21

## 2018-03-01 RX ADMIN — ACETAMINOPHEN SCH MG: 325 TABLET ORAL at 05:09

## 2018-03-01 RX ADMIN — OXYCODONE HYDROCHLORIDE PRN MG: 15 TABLET ORAL at 12:30

## 2018-03-01 RX ADMIN — CYCLOBENZAPRINE HYDROCHLORIDE PRN MG: 10 TABLET, FILM COATED ORAL at 12:31

## 2018-03-01 RX ADMIN — ACETAMINOPHEN SCH MG: 325 TABLET ORAL at 23:44

## 2018-03-01 RX ADMIN — ACETAMINOPHEN SCH MG: 325 TABLET ORAL at 17:57

## 2018-03-01 RX ADMIN — PRAVASTATIN SODIUM SCH MG: 10 TABLET ORAL at 17:58

## 2018-03-01 NOTE — SOAPPROG
SOAP Progress Note


Assessment/Plan: 


Assessment:


POD #1. s/p R JEFFREY


Awake, alert, afebrile.


Normal sciatic nerve function.


Dressing clean and dry.


Post op films look good.


OOB with PT yesterday.


Moderate pain.





Plan:


Cont PT/OT today.


Ambulate as tolerated.


Going to SNF. Must stay 3 nights due to Medicare requirements.


Recheck tomorrow.  Anticipate transfer Saturday.


03/01/18 07:22





Objective: 





 Vital Signs











Temp Pulse Resp BP Pulse Ox


 


 37.0 C   88   19   118/58 L  96 


 


 03/01/18 04:00  03/01/18 04:00  03/01/18 04:00  03/01/18 04:00  03/01/18 04:00








 Laboratory Results





 03/01/18 05:13 





 











 02/28/18 03/01/18 03/02/18





 05:59 05:59 05:59


 


Intake Total  3647 


 


Output Total  0045 


 


Balance  1572 














ICD10 Worksheet


Patient Problems: 


 Problems











Problem Status Onset


 


Osteoarthritis of right hip Acute

## 2018-03-02 VITALS — RESPIRATION RATE: 16 BRPM

## 2018-03-02 RX ADMIN — FLUTICASONE PROPIONATE PRN SPR: 50 SPRAY, METERED NASAL at 21:41

## 2018-03-02 RX ADMIN — Medication SCH MG: at 09:27

## 2018-03-02 RX ADMIN — FLUTICASONE PROPIONATE PRN SPR: 50 SPRAY, METERED NASAL at 09:39

## 2018-03-02 RX ADMIN — DOCUSATE SODIUM AND SENNOSIDES SCH TAB: 50; 8.6 TABLET ORAL at 21:34

## 2018-03-02 RX ADMIN — DOCUSATE SODIUM AND SENNOSIDES SCH TAB: 50; 8.6 TABLET ORAL at 09:27

## 2018-03-02 RX ADMIN — NICOTINE SCH MG: 21 PATCH, EXTENDED RELEASE TOPICAL at 09:27

## 2018-03-02 RX ADMIN — IPRATROPIUM BROMIDE PRN SPR: 42 SPRAY, METERED NASAL at 09:38

## 2018-03-02 RX ADMIN — ACETAMINOPHEN SCH MG: 325 TABLET ORAL at 17:46

## 2018-03-02 RX ADMIN — ACETAMINOPHEN SCH MG: 325 TABLET ORAL at 13:07

## 2018-03-02 RX ADMIN — OXYMETAZOLINE HYDROCHLORIDE PRN SPR: 0.05 SPRAY NASAL at 21:40

## 2018-03-02 RX ADMIN — CYCLOBENZAPRINE HYDROCHLORIDE PRN MG: 10 TABLET, FILM COATED ORAL at 21:36

## 2018-03-02 RX ADMIN — FAMOTIDINE SCH MG: 20 TABLET, FILM COATED ORAL at 09:27

## 2018-03-02 RX ADMIN — ASPIRIN SCH MG: 325 TABLET, FILM COATED ORAL at 09:27

## 2018-03-02 RX ADMIN — OXYMETAZOLINE HYDROCHLORIDE PRN SPR: 0.05 SPRAY NASAL at 09:39

## 2018-03-02 RX ADMIN — CYCLOBENZAPRINE HYDROCHLORIDE PRN MG: 10 TABLET, FILM COATED ORAL at 09:27

## 2018-03-02 RX ADMIN — ACETAMINOPHEN SCH MG: 325 TABLET ORAL at 23:12

## 2018-03-02 RX ADMIN — ACETAMINOPHEN SCH MG: 325 TABLET ORAL at 05:18

## 2018-03-02 RX ADMIN — MORPHINE SULFATE SCH MG: 15 TABLET, FILM COATED, EXTENDED RELEASE ORAL at 21:36

## 2018-03-02 RX ADMIN — OXYCODONE HYDROCHLORIDE PRN MG: 15 TABLET ORAL at 05:18

## 2018-03-02 RX ADMIN — MORPHINE SULFATE SCH MG: 15 TABLET, FILM COATED, EXTENDED RELEASE ORAL at 09:27

## 2018-03-02 RX ADMIN — IPRATROPIUM BROMIDE PRN SPR: 42 SPRAY, METERED NASAL at 21:40

## 2018-03-02 RX ADMIN — FAMOTIDINE SCH MG: 20 TABLET, FILM COATED ORAL at 21:35

## 2018-03-02 RX ADMIN — PRAVASTATIN SODIUM SCH MG: 10 TABLET ORAL at 17:47

## 2018-03-02 NOTE — ASMTCMCOM
CM Note

 

CM Note                       

Notes:

Pt has been accepted at [facility] and [facility] SNFs. As of this morning pt 

had been undecided and wanted to talk to her daughters who have toured the facilities CM to follow 

for SNF choice. 



D/c plan of care: SNF when medically stable. 

 

Date Signed:  03/02/2018 02:46 PM

Electronically Signed By:REESE Lopez

## 2018-03-02 NOTE — GDS
[f rep st]



                                                             DISCHARGE SUMMARY





ADMISSION DIAGNOSIS:  Right hip arthritis.



DISCHARGE DIAGNOSIS:  Right hip arthritis.



OPERATION PERFORMED:  2/28/18:  Right total hip arthroplasty.



POSTOPERATIVE COMPLICATIONS:  None.



CONDITION ON DISCHARGE:  Improved.



DESCRIPTION OF HOSPITAL COURSE:  The patient was admitted to the hospital on the morning of surgery. 
 Her admission CBC, electrolytes, BUN, and creatinine were all normal.  The same day, under a combina
tion of Marcaine, spinal, and IV sedation, she underwent a right total hip arthroplasty.  Postoperati
vely, she was treated with multimodal DVT prophylaxis, including aspirin.  On the 2nd postoperative d
ay, her hemoglobin and hematocrit were 11.8 and 34.3.  She did not need any transfused blood.  She wa
s seen by Physical Therapy and made slow progress with mobilization and walking.  By the time of disc
harge, she was afebrile, her wound was clean and dry, and she was independent walking with a walker.



DISPOSITION:  The patient is transferred to [facility].  She may progress to full weightbe
aring on the right as tolerated.  Use JANKI stockings for 1 week.  Continue aspirin 325 mg daily for 21
 days.  She was on large doses of narcotics preoperatively for chronic pain.  She will continue those
 postoperatively.  I will see her back in the office on March 22, 2018.  If there any problems, she i
s to call me at the office.



Copy requested to:

[facility]



Job #:  [id number]

## 2018-03-02 NOTE — SOAPPROG
SOAP Progress Note


Assessment/Plan: 


Assessment:





Afebrile.  Vital signs stable.


She has been up walking in the room.


She was complaining of right thigh cramping and right knee pain yesterday.


Radiographs of her right knee are normal.


She is more comfortable today.


Her dressing is dry.


Hemoglobin and hematocrit are stable.




















Plan:


Continue physical therapy today.


Transfer to [facility] on Saturday.


03/02/18 06:50





Objective: 





 Vital Signs











Temp Pulse Resp BP Pulse Ox


 


 37 C   77   17   140/81 H  92 


 


 03/02/18 04:00  03/02/18 04:00  03/02/18 04:00  03/02/18 04:00  03/02/18 04:00








 Laboratory Results





 03/02/18 05:00 





 











 03/01/18 03/02/18 03/03/18





 05:59 05:59 05:59


 


Intake Total 3647 1250 


 


Output Total 2143 4310 


 


Balance 1572 -600 














ICD10 Worksheet


Patient Problems: 


 Problems











Problem Status Onset


 


Osteoarthritis of right hip Acute

## 2018-03-03 VITALS
SYSTOLIC BLOOD PRESSURE: 125 MMHG | OXYGEN SATURATION: 95 % | TEMPERATURE: 98.4 F | HEART RATE: 75 BPM | DIASTOLIC BLOOD PRESSURE: 74 MMHG

## 2018-03-03 RX ADMIN — Medication SCH MG: at 08:09

## 2018-03-03 RX ADMIN — MORPHINE SULFATE SCH MG: 15 TABLET, FILM COATED, EXTENDED RELEASE ORAL at 08:10

## 2018-03-03 RX ADMIN — FAMOTIDINE SCH MG: 20 TABLET, FILM COATED ORAL at 08:09

## 2018-03-03 RX ADMIN — ACETAMINOPHEN SCH: 325 TABLET ORAL at 13:20

## 2018-03-03 RX ADMIN — NICOTINE SCH MG: 21 PATCH, EXTENDED RELEASE TOPICAL at 08:09

## 2018-03-03 RX ADMIN — OXYCODONE HYDROCHLORIDE PRN MG: 15 TABLET ORAL at 00:38

## 2018-03-03 RX ADMIN — OXYCODONE HYDROCHLORIDE PRN MG: 15 TABLET ORAL at 08:01

## 2018-03-03 RX ADMIN — ASPIRIN SCH MG: 325 TABLET, FILM COATED ORAL at 08:10

## 2018-03-03 RX ADMIN — OXYCODONE HYDROCHLORIDE PRN MG: 15 TABLET ORAL at 10:54

## 2018-03-03 RX ADMIN — ACETAMINOPHEN SCH MG: 325 TABLET ORAL at 06:16

## 2018-03-03 RX ADMIN — DOCUSATE SODIUM AND SENNOSIDES SCH TAB: 50; 8.6 TABLET ORAL at 08:09

## 2018-03-03 NOTE — ASDISCHSUM
----------------------------------------------

Discharge Information

----------------------------------------------

Plan Status:SNF                                      Medically Cleared to Leave:03/02/2018

Discharge Date:03/02/2018                            CM D/C Disposition:Skilled Nursing Facility

ADT D/C Disposition:Skilled Nursing Facility         Projected Discharge Date:03/03/2018 01:00 PM

Transportation at D/C:Wheelchair Van                 Discharge Delay Reason:

Follow-Up Date:03/03/2018 01:00 PM                   Discharge Slot:

Final Diagnosis:R JEFFREY

----------------------------------------------

Placement Information

----------------------------------------------

Referral Type:*Nursing Home/SNF                      Referral ID:[id number]

Provider Name:Lor [facility]

Address 1:[address]                         Phone Number:

Address 2:                                           Fax Number:

City:[place]                                       Selection Factors:

State:[place]

 

----------------------------------------------

Patient Contact Information

----------------------------------------------

Contact Name:MAXWELL                            Relationship:Daughter

Address:                                             Home Phone:

                                                     Work [phone]

City:                                                [facility] Phone:

[facility] Code:                                      Email:

----------------------------------------------

Financial Information

----------------------------------------------

Financial Class:Medicare

Primary Plan Desc:MEDICARE INPATIENT                 Primary Plan Number:[id number]

Secondary Plan Desc:[place]      Secondary Plan Number:[id number]

 

 

----------------------------------------------

Assessment Information

----------------------------------------------

----------------------------------------------

[facility] CM Progress Note

----------------------------------------------

CM Note

 

CM Note                       

Notes:

Pt s/p R total hip arthroplasty. PT rec SNF today, pt sleeping but dghtrs present and request 

referral to [facility] (referral sent in [employer]). CM to follow for d/c planning. 

 

Date Signed:  02/28/2018 02:30 PM

Electronically Signed By:REESE Lopez

 

 

----------------------------------------------

[facility] CM Progress Note

----------------------------------------------

CM Note

 

CM Note                       

Notes:

Pt has been accepted at [facility]. As of this morning pt 

had been undecided and wanted to talk to her daughters who have toured the facilities CM to follow 

for SNF choice. 



D/c plan of care: SNF when medically stable. 

 

Date Signed:  03/02/2018 02:46 PM

Electronically Signed By:REESE Lopez

 

 

----------------------------------------------

Intervention Information

----------------------------------------------

Intervention Type:*IM-Signed                         Date of Service:03/02/2018 02:14 PM

Patient Type:Inpatient                               Staff Member:Petra Knapp

Hours:                                               Discipline:

Severity:                                            Comment:

## 2018-03-03 NOTE — PDIAF
- Diagnosis


Diagnosis: Hip OA


Code Status: Full Code





- Medication Management


Discharge Medications: 


 Medications to Continue on Transfer





Albuterol [Proventil Inhaler HFA (*)] 1 - 2 puffs IH DAILY PRN 01/30/18 [Last 

Taken 02/21/18]


Calcium Carbonate [Oyster Shell Calcium 500 mg (*)] 500 mg PO DAILY 01/30/18 [

Last Taken 02/18/18]


Cholecalciferol Vit D3 [Vitamin D3 (*)] 5,000 units PO DAILY 01/30/18 [Last 

Taken 02/18/18]


Cyanocobalamin [Vitamin B12 (*)] 1,000 mcg PO DAILY 01/30/18 [Last Taken 02/18/ 18]


Cyclobenzaprine [Flexeril 10 MG (*)] 10 mg PO TID PRN 01/30/18 [Last Taken 02/27 /18]


Fluticasone Nasal [Flonase Nasal Spray] 1 sprays NASAL BID PRN 01/30/18 [Last 

Taken 02/27/18]


Furosemide [Lasix 20 MG (*)] 20 mg PO DAILY PRN 01/30/18 [Last Taken 02/26/18]


Herbals/Supplements -Info Only 1 ea PO DAILY 01/30/18 [Last Taken 02/18/18]


Magnesium Hydroxide [Milk of Magnesia] 400 mg PO DAILY PRN 01/30/18 [Last Taken 

02/18/18]


Potassium Cl [Klor-Con 10 meq (RX)] 10 meq PO DAILY PRN 01/30/18 [Last Taken 02/ 26/18]


Simvastatin [Zocor] 5 mg PO DAILY18 01/30/18 [Last Taken 02/27/18]


diphenhydrAMINE [Benadryl 25 MG (*)] 25 mg PO HS 01/30/18 [Last Taken 02/26/18]


morphINE IR [morphINE IR 15 mg (*)] 15 mg PO TID PRN 01/30/18 [Last Taken 02/28/ 18]


morphINE SR [Ms Contin/Oramorph 15 mg (*)] 15 mg PO BID 01/30/18 [Last Taken 02/ 28/18]


traMADol [Ultram 50 mg (*)] 50 mg PO TID PRN 01/30/18 [Last Taken 02/27/18]


Ipratropium 0.06% Nasal [Atrovent 0.06% Nasal] 2 sprays EACHNARE QID PRN 03/01/ 18 [Last Taken Unknown]


Oxymetazoline HCl [Sinus Nasal Spray] 2 ml NS BID PRN 03/01/18 [Last Taken 

Unknown]


Acetaminophen [Tylenol 325mg (*)] 650 mg PO Q6HRS  tab 03/03/18 [Last Taken 

Unknown]


Aspirin [Aspirin 325 mg (*)] 325 mg PO DAILY  tab 03/03/18 [Last Taken Unknown]


Ferrous Sulfate [Slow Fe 140 MG (*)] 140 mg PO DAILY  tab.er 03/03/18 [Last 

Taken Unknown]


Ondansetron Odt [Zofran Odt 4 mg (*)] 4 mg PO Q4HRS PRN  tab 03/03/18 [Last 

Taken Unknown]


Sennosides/Docusate Sodium [Senokot-S] 1 - 2 tab PO BID  tab 03/03/18 [Last 

Taken Unknown]


celeCOXIB [Celebrex (*)] 200 mg PO DAILY  cap 03/03/18 [Last Taken Unknown]


oxyCODONE IR [Oxycodone Ir (*)] 5 - 10 mg PO Q3HRS PRN  tab 03/03/18 [Last 

Taken Unknown]








Discharge Medications: Refer to the Discharge Home Medication list for PRN 

reason.





- Orders


Services needed: Physical Therapy, Occupational Therapy


Diet Recommendation: no restrictions on diet


Diet Texture: Regular Texture Diet


Rose: Not applicable


Jose Raul Stockings Discontinue Date: 1 week


Wound Care Instructions: keep clean and dry. You may shower.


Activity/Weight Bearing Restrictions: as tolerated.





- Follow Up Care


Current Providers and Referrals: 


Krystle Pinto MD [Primary Care Provider] - 


Gurjit Vicente MD [Medical Doctor] - follow up as scheduled (follow up in 3 

weeks as planned.)

## 2018-03-03 NOTE — ASMTCMCOM
CM Note

 

CM Note                       

Notes:

Dc order received. Met with pt to discuss dc to SNF; pt would like to go to [facility]. Spoke withjanice Isaac, at [facility]; confirmed bed available. Dc paperwork faxed; confirmed received. Spoke with 


Tierney, at [facility], to arrange transport. Alerted pt & RN. Pt to notify her daughter. No other 

needs at this time.

 

Date Signed:  03/03/2018 11:07 AM

Electronically Signed By:Sharee Helms RN

## 2019-01-16 ENCOUNTER — HOSPITAL ENCOUNTER (OUTPATIENT)
Dept: HOSPITAL 80 - CIMAGING | Age: 69
End: 2019-01-16
Attending: FAMILY MEDICINE
Payer: COMMERCIAL

## 2019-01-16 DIAGNOSIS — Z12.31: Primary | ICD-10-CM

## 2019-01-16 DIAGNOSIS — Z80.3: ICD-10-CM
